# Patient Record
Sex: FEMALE | Race: WHITE | NOT HISPANIC OR LATINO | URBAN - METROPOLITAN AREA
[De-identification: names, ages, dates, MRNs, and addresses within clinical notes are randomized per-mention and may not be internally consistent; named-entity substitution may affect disease eponyms.]

---

## 2017-03-17 ENCOUNTER — APPOINTMENT (RX ONLY)
Dept: URBAN - METROPOLITAN AREA CLINIC 57 | Facility: CLINIC | Age: 57
Setting detail: DERMATOLOGY
End: 2017-03-17

## 2017-03-17 DIAGNOSIS — L98.8 OTHER SPECIFIED DISORDERS OF THE SKIN AND SUBCUTANEOUS TISSUE: ICD-10-CM

## 2017-03-17 PROBLEM — E03.9 HYPOTHYROIDISM, UNSPECIFIED: Status: ACTIVE | Noted: 2017-03-17

## 2017-03-17 PROBLEM — L90.8 OTHER ATROPHIC DISORDERS OF SKIN: Status: ACTIVE | Noted: 2017-03-17

## 2017-03-17 PROCEDURE — ? FILLERS

## 2017-03-17 PROCEDURE — ? BOTOX

## 2017-03-17 ASSESSMENT — LOCATION SIMPLE DESCRIPTION DERM
LOCATION SIMPLE: RIGHT CHEEK
LOCATION SIMPLE: GLABELLA
LOCATION SIMPLE: RIGHT EYEBROW
LOCATION SIMPLE: LEFT EYEBROW
LOCATION SIMPLE: LEFT LIP
LOCATION SIMPLE: LEFT FOREHEAD

## 2017-03-17 ASSESSMENT — LOCATION DETAILED DESCRIPTION DERM
LOCATION DETAILED: LEFT LOWER CUTANEOUS LIP
LOCATION DETAILED: RIGHT CENTRAL EYEBROW
LOCATION DETAILED: RIGHT MEDIAL BUCCAL CHEEK
LOCATION DETAILED: LEFT CENTRAL EYEBROW
LOCATION DETAILED: GLABELLA
LOCATION DETAILED: LEFT INFERIOR MEDIAL FOREHEAD
LOCATION DETAILED: RIGHT CENTRAL BUCCAL CHEEK

## 2017-03-17 ASSESSMENT — LOCATION ZONE DERM
LOCATION ZONE: FACE
LOCATION ZONE: LIP

## 2017-03-17 NOTE — PROCEDURE: FILLERS
Tear Troughs Filler  Volume In Cc: 0
Anesthesia Volume In Cc: 0.5
Detail Level: Detailed
Post-Care Instructions: Patient instructed to apply ice to reduce swelling.
Topical Anesthesia?: ice and vibration
Marionette Lines Filler  Volume In Cc: 0.2
Additional Area 1 Volume In Cc: 0.6
Price (Use Numbers Only, No Special Characters Or $): 869
Map Statment: See Attach Map for Complete Details
Filler: Volbella
Additional Anesthesia Volume In Cc: 6
Expiration Date (Month Year): 04/2018
Anesthesia Type: 1% lidocaine with epinephrine
Expiration Date (Month Year): 01/2018
Use Map Statement For Sites (Optional): No
Additional Area 1 Location: Lips
Filler: Juvederm Ultra Plus XC
Consent: Written consent obtained. Risks include but not limited to bruising, beading, irregular texture, ulceration, infection, allergic reaction, scar formation, incomplete augmentation, temporary nature, procedural pain.
Lot #: [id number]
Mid Face Filler  Volume In Cc: 2
Additional Area 2 Location: Chin
Lot #: [id number]

## 2017-03-17 NOTE — PROCEDURE: BOTOX
Periorbital Skin Units: 0
Post-Care Instructions: Patient instructed to not lie down for 4 hours and limit physical activity for 24 hours. Patient instructed not to travel by airplane for 48 hours.
Depressor Anguli Oris Units: 6
Expiration Date (Month Year): 10/2019
Consent: Written consent obtained. Risks include but not limited to lid/brow ptosis, bruising, swelling, diplopia, temporary effect, incomplete chemical denervation.
Detail Level: Zone
Glabellar Complex Units: 20
Lot #: [id number]
Dilution (U/0.1 Cc): 4
Price (Use Numbers Only, No Special Characters Or $): 096

## 2018-02-23 ENCOUNTER — APPOINTMENT (RX ONLY)
Dept: URBAN - METROPOLITAN AREA CLINIC 57 | Facility: CLINIC | Age: 58
Setting detail: DERMATOLOGY
End: 2018-02-23

## 2018-02-23 DIAGNOSIS — L98.8 OTHER SPECIFIED DISORDERS OF THE SKIN AND SUBCUTANEOUS TISSUE: ICD-10-CM

## 2018-02-23 PROCEDURE — ? BOTOX

## 2018-02-23 PROCEDURE — ? FILLERS

## 2018-02-23 ASSESSMENT — LOCATION SIMPLE DESCRIPTION DERM
LOCATION SIMPLE: LEFT FOREHEAD
LOCATION SIMPLE: RIGHT EYEBROW
LOCATION SIMPLE: RIGHT FOREHEAD
LOCATION SIMPLE: GLABELLA
LOCATION SIMPLE: LEFT EYEBROW

## 2018-02-23 ASSESSMENT — LOCATION DETAILED DESCRIPTION DERM
LOCATION DETAILED: RIGHT INFERIOR FOREHEAD
LOCATION DETAILED: LEFT CENTRAL EYEBROW
LOCATION DETAILED: LEFT INFERIOR MEDIAL FOREHEAD
LOCATION DETAILED: GLABELLA
LOCATION DETAILED: RIGHT CENTRAL EYEBROW

## 2018-02-23 ASSESSMENT — LOCATION ZONE DERM: LOCATION ZONE: FACE

## 2018-02-23 NOTE — PROCEDURE: BOTOX
Glabellar Complex Units: 20
Dilution (U/0.1 Cc): 4
Additional Area 6 Units: 0
Detail Level: Zone
Lot #: [id number]
Consent: Written consent obtained. Risks include but not limited to lid/brow ptosis, bruising, swelling, diplopia, temporary effect, incomplete chemical denervation.
Expiration Date (Month Year): 10/2020
Post-Care Instructions: Patient instructed to not lie down for 4 hours and limit physical activity for 24 hours. Patient instructed not to travel by airplane for 48 hours.
Price (Use Numbers Only, No Special Characters Or $): 772

## 2018-02-23 NOTE — PROCEDURE: FILLERS
Tear Troughs Filler  Volume In Cc: 0
Filler: Volbella
Topical Anesthesia?: BLT cream (benzocaine 20%, lidocaine 6%, tetracaine 4%)
Consent: Written consent obtained. Risks include but not limited to bruising, beading, irregular texture, ulceration, infection, allergic reaction, scar formation, incomplete augmentation, temporary nature, procedural pain.
Filler: Juvederm Vollure XC
Additional Anesthesia Volume In Cc: 6
Expiration Date (Month Year): 01/2019
Additional Area 2 Location: Chin
Lot #: [id number]
Post-Care Instructions: Patient instructed to apply ice to reduce swelling.
Expiration Date (Month Year): 02/2019
Additional Area 2 Volume In Cc: 0.2
Vermilion Lips Filler Volume In Cc: 0.6
Use Map Statement For Sites (Optional): No
Anesthesia Volume In Cc: 0.5
Price (Use Numbers Only, No Special Characters Or $): 929
Detail Level: Detailed
Anesthesia Type: 1% lidocaine with epinephrine
Lot #: [id number]
Map Statment: See Attach Map for Complete Details
Mid Face Filler  Volume In Cc: 0.4

## 2019-02-15 ENCOUNTER — APPOINTMENT (RX ONLY)
Dept: URBAN - METROPOLITAN AREA CLINIC 57 | Facility: CLINIC | Age: 59
Setting detail: DERMATOLOGY
End: 2019-02-15

## 2019-02-15 DIAGNOSIS — L98.8 OTHER SPECIFIED DISORDERS OF THE SKIN AND SUBCUTANEOUS TISSUE: ICD-10-CM

## 2019-02-15 PROCEDURE — ? BOTOX

## 2019-02-15 PROCEDURE — ? FILLERS

## 2019-02-15 ASSESSMENT — LOCATION DETAILED DESCRIPTION DERM
LOCATION DETAILED: RIGHT SUPERIOR FOREHEAD
LOCATION DETAILED: GLABELLA
LOCATION DETAILED: RIGHT MEDIAL FOREHEAD
LOCATION DETAILED: RIGHT INFERIOR FOREHEAD
LOCATION DETAILED: NASAL ROOT
LOCATION DETAILED: LEFT SUPERIOR FOREHEAD
LOCATION DETAILED: LEFT CENTRAL EYEBROW
LOCATION DETAILED: RIGHT FOREHEAD
LOCATION DETAILED: LEFT INFERIOR FOREHEAD
LOCATION DETAILED: LEFT FOREHEAD
LOCATION DETAILED: LEFT INFERIOR MEDIAL FOREHEAD
LOCATION DETAILED: LEFT MEDIAL FOREHEAD

## 2019-02-15 ASSESSMENT — LOCATION SIMPLE DESCRIPTION DERM
LOCATION SIMPLE: LEFT EYEBROW
LOCATION SIMPLE: GLABELLA
LOCATION SIMPLE: NOSE
LOCATION SIMPLE: RIGHT FOREHEAD
LOCATION SIMPLE: LEFT FOREHEAD

## 2019-02-15 ASSESSMENT — LOCATION ZONE DERM
LOCATION ZONE: FACE
LOCATION ZONE: NOSE

## 2019-02-15 NOTE — PROCEDURE: BOTOX
Additional Area 6 Units: 0
Glabellar Complex Units: 12
Price (Use Numbers Only, No Special Characters Or $): 506
Lot #: [id number]
Nasal Root Units: 8
Consent: Written consent obtained. Risks include but not limited to lid/brow ptosis, bruising, swelling, diplopia, temporary effect, incomplete chemical denervation.
Detail Level: Zone
Dilution (U/0.1 Cc): 4
Forehead Units: 10
Post-Care Instructions: Patient instructed to not lie down for 4 hours and limit physical activity for 24 hours. Patient instructed not to travel by airplane for 48 hours.
Expiration Date (Month Year): 05/2021

## 2019-02-15 NOTE — PROCEDURE: FILLERS
Dorsal Hands Filler  Volume In Cc: 0
Filler: Radiesse+
Price (Use Numbers Only, No Special Characters Or $): 141
Post-Care Instructions: Patient instructed to apply ice to reduce swelling.
Additional Area 2 Volume In Cc: 0.2
Include Cannula Information In Note?: No
Additional Area 2 Location: Chin
Vermilion Lips Filler Volume In Cc: 0.6
Detail Level: Detailed
Lot #: [id number]
Filler: Volbella
Mid Face Filler  Volume In Cc: 0.4
Anesthesia Type: 1% lidocaine with epinephrine
Expiration Date (Month Year): 06/04/2020
Cheeks Filler Volume In Cc: 0.7
Additional Anesthesia Volume In Cc: 6
Lot #: [id number]
Consent: Written consent obtained. Risks include but not limited to bruising, beading, irregular texture, ulceration, infection, allergic reaction, scar formation, incomplete augmentation, temporary nature, procedural pain.
Expiration Date (Month Year): 10/17/2020
Topical Anesthesia?: BLT cream (benzocaine 20%, lidocaine 6%, tetracaine 4%)
Anesthesia Volume In Cc: 0.5
Map Statment: See Attach Map for Complete Details

## 2020-02-21 ENCOUNTER — APPOINTMENT (RX ONLY)
Dept: URBAN - METROPOLITAN AREA CLINIC 57 | Facility: CLINIC | Age: 60
Setting detail: DERMATOLOGY
End: 2020-02-21

## 2020-02-21 DIAGNOSIS — L98.8 OTHER SPECIFIED DISORDERS OF THE SKIN AND SUBCUTANEOUS TISSUE: ICD-10-CM

## 2020-02-21 PROCEDURE — ? BOTOX

## 2020-02-21 PROCEDURE — ? FILLERS

## 2020-02-21 ASSESSMENT — LOCATION DETAILED DESCRIPTION DERM
LOCATION DETAILED: LEFT MEDIAL EYEBROW
LOCATION DETAILED: GLABELLA
LOCATION DETAILED: RIGHT CENTRAL EYEBROW
LOCATION DETAILED: LEFT CENTRAL EYEBROW
LOCATION DETAILED: RIGHT MEDIAL EYEBROW

## 2020-02-21 ASSESSMENT — LOCATION ZONE DERM: LOCATION ZONE: FACE

## 2020-02-21 ASSESSMENT — LOCATION SIMPLE DESCRIPTION DERM
LOCATION SIMPLE: LEFT EYEBROW
LOCATION SIMPLE: GLABELLA
LOCATION SIMPLE: RIGHT EYEBROW

## 2020-02-21 NOTE — PROCEDURE: FILLERS
Additional Area 5 Volume In Cc: 0
Marionette Lines Filler  Volume In Cc: 0.5
Include Cannula Information In Note?: No
Expiration Date (Month Year): 02/04/2021
Anesthesia Type: 1% lidocaine with epinephrine
Expiration Date (Month Year): 11/13/20
Additional Anesthesia Volume In Cc: 6
Consent: Written consent obtained. Risks include but not limited to bruising, beading, irregular texture, ulceration, infection, allergic reaction, scar formation, incomplete augmentation, temporary nature, procedural pain.
Detail Level: Detailed
Filler: Voluma
Post-Care Instructions: Patient instructed to apply ice to reduce swelling.
Filler: Juvederm Vollure XC
Price (Use Numbers Only, No Special Characters Or $): 5218
Cheeks Filler Volume In Cc: 1
Map Statment: See Attach Map for Complete Details
Lot #: [id number]
Lot #: [id number]

## 2020-02-21 NOTE — PROCEDURE: BOTOX
Show Right And Left Brow Units: No
Additional Area 3 Units: 0
Show Nasal Units: Yes
Glabellar Complex Units: 30
Post-Care Instructions: Patient instructed to not lie down for 4 hours and limit physical activity for 24 hours. Patient instructed not to travel by airplane for 48 hours.
Expiration Date (Month Year): 08/2022
Price (Use Numbers Only, No Special Characters Or $): 159
Lot #: [id number]
Detail Level: Zone
Dilution (U/0.1 Cc): 4
Consent: Written consent obtained. Risks include but not limited to lid/brow ptosis, bruising, swelling, diplopia, temporary effect, incomplete chemical denervation.

## 2020-11-20 ENCOUNTER — APPOINTMENT (RX ONLY)
Dept: URBAN - METROPOLITAN AREA CLINIC 125 | Facility: CLINIC | Age: 60
Setting detail: DERMATOLOGY
End: 2020-11-20

## 2020-11-20 DIAGNOSIS — Z41.9 ENCOUNTER FOR PROCEDURE FOR PURPOSES OTHER THAN REMEDYING HEALTH STATE, UNSPECIFIED: ICD-10-CM

## 2020-11-20 DIAGNOSIS — L98.8 OTHER SPECIFIED DISORDERS OF THE SKIN AND SUBCUTANEOUS TISSUE: ICD-10-CM

## 2020-11-20 PROCEDURE — ? BOTOX

## 2020-11-20 PROCEDURE — ? MEDICAL CONSULTATION: BBL

## 2020-11-20 ASSESSMENT — LOCATION SIMPLE DESCRIPTION DERM
LOCATION SIMPLE: LEFT CHEEK
LOCATION SIMPLE: LEFT EYELID
LOCATION SIMPLE: RIGHT EYEBROW
LOCATION SIMPLE: RIGHT FOREHEAD
LOCATION SIMPLE: RIGHT CHEEK
LOCATION SIMPLE: LEFT EYEBROW
LOCATION SIMPLE: GLABELLA

## 2020-11-20 ASSESSMENT — LOCATION DETAILED DESCRIPTION DERM
LOCATION DETAILED: GLABELLA
LOCATION DETAILED: LEFT CENTRAL EYEBROW
LOCATION DETAILED: RIGHT CENTRAL EYEBROW
LOCATION DETAILED: LEFT CENTRAL MALAR CHEEK
LOCATION DETAILED: RIGHT MEDIAL EYEBROW
LOCATION DETAILED: LEFT MEDIAL CANTHUS
LOCATION DETAILED: RIGHT INFERIOR FOREHEAD
LOCATION DETAILED: RIGHT SUPERIOR NASAL CHEEK
LOCATION DETAILED: LEFT MEDIAL EYEBROW

## 2020-11-20 ASSESSMENT — LOCATION ZONE DERM
LOCATION ZONE: FACE
LOCATION ZONE: FACE
LOCATION ZONE: EYELID

## 2020-11-20 NOTE — PROCEDURE: BOTOX
Show Right And Left Brow Units: No
Additional Area 3 Units: 0
Show Nasal Units: Yes
Glabellar Complex Units: 30
Post-Care Instructions: Patient instructed to not lie down for 4 hours and limit physical activity for 24 hours. Patient instructed not to travel by airplane for 48 hours.
Expiration Date (Month Year): 08/2023
Price (Use Numbers Only, No Special Characters Or $): 705
Lot #: [id number]
Detail Level: Zone
Dilution (U/0.1 Cc): 4
Consent: Written consent obtained. Risks include but not limited to lid/brow ptosis, bruising, swelling, diplopia, temporary effect, incomplete chemical denervation.

## 2020-12-04 ENCOUNTER — APPOINTMENT (RX ONLY)
Dept: URBAN - METROPOLITAN AREA CLINIC 125 | Facility: CLINIC | Age: 60
Setting detail: DERMATOLOGY
End: 2020-12-04

## 2020-12-04 DIAGNOSIS — L71.8 OTHER ROSACEA: ICD-10-CM

## 2020-12-04 PROCEDURE — ? SCITON BBL

## 2020-12-04 ASSESSMENT — LOCATION ZONE DERM: LOCATION ZONE: FACE

## 2020-12-04 ASSESSMENT — LOCATION SIMPLE DESCRIPTION DERM: LOCATION SIMPLE: LEFT CHEEK

## 2020-12-04 ASSESSMENT — LOCATION DETAILED DESCRIPTION DERM: LOCATION DETAILED: LEFT CENTRAL MALAR CHEEK

## 2020-12-04 NOTE — PROCEDURE: SCITON BBL
Repetition Rate (Hz): 1
Cooling (In C): 15
Total Pulses (Optional): 342
Hide Repetition Rate?: No
Spot Size: Finesse Adapter Size: 15 x 45 mm (No Finesse Adapter)
Pulse Duration: 10
Spot Size: Finesse Adapter Size: 11 mm round
Pulse Duration Units: milliseconds
Fluence (J/Cm2): 20
Topical Anesthesia?: 20% benzocaine, 8% lidocaine, 4% tetracaine
Cooling ?: Yes
Total Pulses (Optional): 72
Pulse Duration: 20
Detail Level: Zone
Additional Comments (Optional): series 1/3 GWP this monthfree alastin SPF and trial eye cream
Passes: 2
Length Of Topical Anesthesia Application (Optional): 20 minutes
Total Pulses (Optional): 101
Anesthesia Volume In Cc: 0
Fluence (J/Cm2): 25
Fluence (J/Cm2): 14
Preprocedure Text: The treatment areas were thoroughly cleaned. Any exposed at risk hair that was not to be treated was covered in white paper tape. Clear ultrasound gel was applied to the treatment area. The area was treated with no immediate stacking of pulses.
Post Procedure Text: The patient tolerated the procedure well. Ice-chilled washclothes were applied to the treatment area for comfort. Post care was reviewd with the paitent.
Spot Size: Finesse Adapter Size: 15 x 15 mm square
Price (Use Numbers Only, No Special Characters Or $): 130.00
Consent: Written consent obtained, risks reviewed including but not limited to crusting, scabbing, blistering, scarring, darker or lighter pigmentary change, bruising, and/or incomplete response.
Fluence (J/Cm2): 8
Post-Care Instructions: I reviewed with the patient in detail post-care instructions. Patient should stay away from the sun and wear sun protection until treated areas are fully healed.

## 2020-12-28 ENCOUNTER — APPOINTMENT (RX ONLY)
Dept: URBAN - METROPOLITAN AREA CLINIC 125 | Facility: CLINIC | Age: 60
Setting detail: DERMATOLOGY
End: 2020-12-28

## 2020-12-28 DIAGNOSIS — L71.8 OTHER ROSACEA: ICD-10-CM

## 2020-12-28 PROCEDURE — ? SCITON BBL

## 2020-12-28 ASSESSMENT — LOCATION DETAILED DESCRIPTION DERM: LOCATION DETAILED: LEFT CENTRAL MALAR CHEEK

## 2020-12-28 ASSESSMENT — LOCATION ZONE DERM: LOCATION ZONE: FACE

## 2020-12-28 ASSESSMENT — LOCATION SIMPLE DESCRIPTION DERM: LOCATION SIMPLE: LEFT CHEEK

## 2020-12-28 NOTE — PROCEDURE: SCITON BBL
Repetition Rate (Hz): 1
Treatment Number: 2
Cooling (In C): 15
Total Pulses (Optional): 349
Hide Repetition Rate?: No
Spot Size: Finesse Adapter Size: 15 x 45 mm (No Finesse Adapter)
Pulse Duration: 10
Spot Size: Finesse Adapter Size: 11 mm round
Pulse Duration Units: milliseconds
Fluence (J/Cm2): 20
Topical Anesthesia?: 20% benzocaine, 8% lidocaine, 4% tetracaine
Cooling ?: Yes
Total Pulses (Optional): 31
Cooling (In C): 20
Detail Level: Zone
Additional Comments (Optional): series 2/3
Length Of Topical Anesthesia Application (Optional): 20 minutes
Total Pulses (Optional): 111
Anesthesia Volume In Cc: 0
Fluence (J/Cm2): 25
Fluence (J/Cm2): 14
Preprocedure Text: The treatment areas were thoroughly cleaned. Any exposed at risk hair that was not to be treated was covered in white paper tape. Clear ultrasound gel was applied to the treatment area. The area was treated with no immediate stacking of pulses.
Total Pulses (Optional): 44
Post Procedure Text: The patient tolerated the procedure well. Ice-chilled washclothes were applied to the treatment area for comfort. Post care was reviewd with the paitent.
Spot Size: Finesse Adapter Size: 15 x 15 mm square
Consent: Written consent obtained, risks reviewed including but not limited to crusting, scabbing, blistering, scarring, darker or lighter pigmentary change, bruising, and/or incomplete response.
Fluence (J/Cm2): 8
Post-Care Instructions: I reviewed with the patient in detail post-care instructions. Patient should stay away from the sun and wear sun protection until treated areas are fully healed.

## 2021-01-18 ENCOUNTER — APPOINTMENT (RX ONLY)
Dept: URBAN - METROPOLITAN AREA CLINIC 125 | Facility: CLINIC | Age: 61
Setting detail: DERMATOLOGY
End: 2021-01-18

## 2021-01-18 DIAGNOSIS — L71.8 OTHER ROSACEA: ICD-10-CM

## 2021-01-18 PROCEDURE — ? SCITON BBL

## 2021-01-18 ASSESSMENT — LOCATION SIMPLE DESCRIPTION DERM: LOCATION SIMPLE: LEFT CHEEK

## 2021-01-18 ASSESSMENT — LOCATION DETAILED DESCRIPTION DERM: LOCATION DETAILED: LEFT CENTRAL MALAR CHEEK

## 2021-01-18 ASSESSMENT — LOCATION ZONE DERM: LOCATION ZONE: FACE

## 2021-01-18 NOTE — PROCEDURE: SCITON BBL
Repetition Rate (Hz): 1
Treatment Number: 3
Cooling (In C): 15
Total Pulses (Optional): 279
Hide Repetition Rate?: No
Spot Size: Finesse Adapter Size: 15 x 45 mm (No Finesse Adapter)
Additional Comments (Optional): discussed SK’s will not be removed by BBL follow up with [facility] for LN2 gave [facility] brochure
Pulse Duration: 10
Spot Size: Finesse Adapter Size: 11 mm round
Pulse Duration Units: milliseconds
Fluence (J/Cm2): 20
Topical Anesthesia?: 20% benzocaine, 8% lidocaine, 4% tetracaine
Cooling ?: Yes
Total Pulses (Optional): 28
Cooling (In C): 20
Detail Level: Zone
Additional Comments (Optional): series 3/3
Passes: 2
Length Of Topical Anesthesia Application (Optional): 20 minutes
Total Pulses (Optional): 87
Anesthesia Volume In Cc: 0
Fluence (J/Cm2): 25
Fluence (J/Cm2): 14
Preprocedure Text: The treatment areas were thoroughly cleaned. Any exposed at risk hair that was not to be treated was covered in white paper tape. Clear ultrasound gel was applied to the treatment area. The area was treated with no immediate stacking of pulses.
Total Pulses (Optional): 50
Post Procedure Text: The patient tolerated the procedure well. Ice-chilled washclothes were applied to the treatment area for comfort. Post care was reviewd with the paitent.
Spot Size: Finesse Adapter Size: 15 x 15 mm square
Consent: Written consent obtained, risks reviewed including but not limited to crusting, scabbing, blistering, scarring, darker or lighter pigmentary change, bruising, and/or incomplete response.
Spot Size: Finesse Adapter Size: 7 mm round
Fluence (J/Cm2): 8
Post-Care Instructions: I reviewed with the patient in detail post-care instructions. Patient should stay away from the sun and wear sun protection until treated areas are fully healed.